# Patient Record
Sex: FEMALE | Race: WHITE | NOT HISPANIC OR LATINO
[De-identification: names, ages, dates, MRNs, and addresses within clinical notes are randomized per-mention and may not be internally consistent; named-entity substitution may affect disease eponyms.]

---

## 2018-11-13 ENCOUNTER — TRANSCRIPTION ENCOUNTER (OUTPATIENT)
Age: 53
End: 2018-11-13

## 2020-12-03 ENCOUNTER — TRANSCRIPTION ENCOUNTER (OUTPATIENT)
Age: 55
End: 2020-12-03

## 2021-11-05 ENCOUNTER — TRANSCRIPTION ENCOUNTER (OUTPATIENT)
Age: 56
End: 2021-11-05

## 2021-11-19 ENCOUNTER — EMERGENCY (EMERGENCY)
Facility: HOSPITAL | Age: 56
LOS: 1 days | Discharge: HOME | End: 2021-11-19
Attending: EMERGENCY MEDICINE
Payer: COMMERCIAL

## 2021-11-19 VITALS
OXYGEN SATURATION: 94 % | DIASTOLIC BLOOD PRESSURE: 75 MMHG | HEART RATE: 98 BPM | TEMPERATURE: 98 F | WEIGHT: 199.96 LBS | HEIGHT: 61 IN | RESPIRATION RATE: 18 BRPM | SYSTOLIC BLOOD PRESSURE: 112 MMHG

## 2021-11-19 LAB
ALBUMIN SERPL ELPH-MCNC: 3.7 G/DL — SIGNIFICANT CHANGE UP (ref 3.3–5.2)
ALP SERPL-CCNC: 61 U/L — SIGNIFICANT CHANGE UP (ref 40–120)
ALT FLD-CCNC: 13 U/L — SIGNIFICANT CHANGE UP
ANION GAP SERPL CALC-SCNC: 12 MMOL/L — SIGNIFICANT CHANGE UP (ref 5–17)
AST SERPL-CCNC: 14 U/L — SIGNIFICANT CHANGE UP
BASOPHILS # BLD AUTO: 0.07 K/UL — SIGNIFICANT CHANGE UP (ref 0–0.2)
BASOPHILS NFR BLD AUTO: 0.9 % — SIGNIFICANT CHANGE UP (ref 0–2)
BILIRUB SERPL-MCNC: 0.6 MG/DL — SIGNIFICANT CHANGE UP (ref 0.4–2)
BLD GP AB SCN SERPL QL: SIGNIFICANT CHANGE UP
BUN SERPL-MCNC: 30.4 MG/DL — HIGH (ref 8–20)
CALCIUM SERPL-MCNC: 8.6 MG/DL — SIGNIFICANT CHANGE UP (ref 8.6–10.2)
CHLORIDE SERPL-SCNC: 106 MMOL/L — SIGNIFICANT CHANGE UP (ref 98–107)
CO2 SERPL-SCNC: 23 MMOL/L — SIGNIFICANT CHANGE UP (ref 22–29)
CREAT SERPL-MCNC: 0.97 MG/DL — SIGNIFICANT CHANGE UP (ref 0.5–1.3)
EOSINOPHIL # BLD AUTO: 0.25 K/UL — SIGNIFICANT CHANGE UP (ref 0–0.5)
EOSINOPHIL NFR BLD AUTO: 3.1 % — SIGNIFICANT CHANGE UP (ref 0–6)
GLUCOSE SERPL-MCNC: 81 MG/DL — SIGNIFICANT CHANGE UP (ref 70–99)
HCG SERPL-ACNC: <4 MIU/ML — SIGNIFICANT CHANGE UP
HCT VFR BLD CALC: 32 % — LOW (ref 34.5–45)
HGB BLD-MCNC: 10.2 G/DL — LOW (ref 11.5–15.5)
IMM GRANULOCYTES NFR BLD AUTO: 0.3 % — SIGNIFICANT CHANGE UP (ref 0–1.5)
LYMPHOCYTES # BLD AUTO: 1.44 K/UL — SIGNIFICANT CHANGE UP (ref 1–3.3)
LYMPHOCYTES # BLD AUTO: 18 % — SIGNIFICANT CHANGE UP (ref 13–44)
MCHC RBC-ENTMCNC: 30.4 PG — SIGNIFICANT CHANGE UP (ref 27–34)
MCHC RBC-ENTMCNC: 31.9 GM/DL — LOW (ref 32–36)
MCV RBC AUTO: 95.5 FL — SIGNIFICANT CHANGE UP (ref 80–100)
MONOCYTES # BLD AUTO: 0.69 K/UL — SIGNIFICANT CHANGE UP (ref 0–0.9)
MONOCYTES NFR BLD AUTO: 8.6 % — SIGNIFICANT CHANGE UP (ref 2–14)
NEUTROPHILS # BLD AUTO: 5.51 K/UL — SIGNIFICANT CHANGE UP (ref 1.8–7.4)
NEUTROPHILS NFR BLD AUTO: 69.1 % — SIGNIFICANT CHANGE UP (ref 43–77)
PLATELET # BLD AUTO: 387 K/UL — SIGNIFICANT CHANGE UP (ref 150–400)
POTASSIUM SERPL-MCNC: 4.7 MMOL/L — SIGNIFICANT CHANGE UP (ref 3.5–5.3)
POTASSIUM SERPL-SCNC: 4.7 MMOL/L — SIGNIFICANT CHANGE UP (ref 3.5–5.3)
PROT SERPL-MCNC: 6.5 G/DL — LOW (ref 6.6–8.7)
RBC # BLD: 3.35 M/UL — LOW (ref 3.8–5.2)
RBC # FLD: 14.5 % — SIGNIFICANT CHANGE UP (ref 10.3–14.5)
SODIUM SERPL-SCNC: 141 MMOL/L — SIGNIFICANT CHANGE UP (ref 135–145)
WBC # BLD: 7.98 K/UL — SIGNIFICANT CHANGE UP (ref 3.8–10.5)
WBC # FLD AUTO: 7.98 K/UL — SIGNIFICANT CHANGE UP (ref 3.8–10.5)

## 2021-11-19 PROCEDURE — 80053 COMPREHEN METABOLIC PANEL: CPT

## 2021-11-19 PROCEDURE — 74177 CT ABD & PELVIS W/CONTRAST: CPT | Mod: MA

## 2021-11-19 PROCEDURE — 96374 THER/PROPH/DIAG INJ IV PUSH: CPT | Mod: XU

## 2021-11-19 PROCEDURE — 86850 RBC ANTIBODY SCREEN: CPT

## 2021-11-19 PROCEDURE — 84702 CHORIONIC GONADOTROPIN TEST: CPT

## 2021-11-19 PROCEDURE — 85025 COMPLETE CBC W/AUTO DIFF WBC: CPT

## 2021-11-19 PROCEDURE — 99285 EMERGENCY DEPT VISIT HI MDM: CPT

## 2021-11-19 PROCEDURE — 86901 BLOOD TYPING SEROLOGIC RH(D): CPT

## 2021-11-19 PROCEDURE — 36415 COLL VENOUS BLD VENIPUNCTURE: CPT

## 2021-11-19 PROCEDURE — 96375 TX/PRO/DX INJ NEW DRUG ADDON: CPT

## 2021-11-19 PROCEDURE — 96361 HYDRATE IV INFUSION ADD-ON: CPT

## 2021-11-19 PROCEDURE — 99284 EMERGENCY DEPT VISIT MOD MDM: CPT | Mod: 25

## 2021-11-19 PROCEDURE — 74177 CT ABD & PELVIS W/CONTRAST: CPT | Mod: 26,MA

## 2021-11-19 PROCEDURE — 86900 BLOOD TYPING SEROLOGIC ABO: CPT

## 2021-11-19 RX ORDER — IOHEXOL 300 MG/ML
30 INJECTION, SOLUTION INTRAVENOUS ONCE
Refills: 0 | Status: COMPLETED | OUTPATIENT
Start: 2021-11-19 | End: 2021-11-19

## 2021-11-19 RX ORDER — SODIUM CHLORIDE 9 MG/ML
1000 INJECTION INTRAMUSCULAR; INTRAVENOUS; SUBCUTANEOUS ONCE
Refills: 0 | Status: COMPLETED | OUTPATIENT
Start: 2021-11-19 | End: 2021-11-19

## 2021-11-19 RX ORDER — ONDANSETRON 8 MG/1
4 TABLET, FILM COATED ORAL ONCE
Refills: 0 | Status: COMPLETED | OUTPATIENT
Start: 2021-11-19 | End: 2021-11-19

## 2021-11-19 RX ORDER — KETOROLAC TROMETHAMINE 30 MG/ML
15 SYRINGE (ML) INJECTION ONCE
Refills: 0 | Status: DISCONTINUED | OUTPATIENT
Start: 2021-11-19 | End: 2021-11-19

## 2021-11-19 RX ORDER — FAMOTIDINE 10 MG/ML
20 INJECTION INTRAVENOUS ONCE
Refills: 0 | Status: COMPLETED | OUTPATIENT
Start: 2021-11-19 | End: 2021-11-19

## 2021-11-19 RX ADMIN — Medication 15 MILLIGRAM(S): at 17:08

## 2021-11-19 RX ADMIN — SODIUM CHLORIDE 1000 MILLILITER(S): 9 INJECTION INTRAMUSCULAR; INTRAVENOUS; SUBCUTANEOUS at 17:08

## 2021-11-19 RX ADMIN — SODIUM CHLORIDE 1000 MILLILITER(S): 9 INJECTION INTRAMUSCULAR; INTRAVENOUS; SUBCUTANEOUS at 19:01

## 2021-11-19 RX ADMIN — FAMOTIDINE 20 MILLIGRAM(S): 10 INJECTION INTRAVENOUS at 19:01

## 2021-11-19 RX ADMIN — ONDANSETRON 4 MILLIGRAM(S): 8 TABLET, FILM COATED ORAL at 17:08

## 2021-11-19 RX ADMIN — IOHEXOL 30 MILLILITER(S): 300 INJECTION, SOLUTION INTRAVENOUS at 18:47

## 2021-11-19 RX ADMIN — Medication 15 MILLIGRAM(S): at 19:01

## 2021-11-19 NOTE — ED STATDOCS - PATIENT PORTAL LINK FT
You can access the FollowMyHealth Patient Portal offered by [facility] by registering at the following website: [email or web address]. By joining [facility]’s FollowMyHealth portal, you will also be able to view your health information using other applications (apps) compatible with our system.

## 2021-11-19 NOTE — ED ADULT NURSE NOTE - OBJECTIVE STATEMENT
Patient A&O x 3, presenting to the ED with complaints of bloody stools x 3 days and left flank pain x 1 day. Patient stated she also has nausea. Denied SOB, cough, or fever. Patient reported a HX of hemorrhoids. No distress observed on exam, gait steady.

## 2021-11-19 NOTE — ED STATDOCS - CARE PROVIDER_API CALL
Levon Byers)  Gastroenterology; Internal Medicine  [address], [address]  [place] [address]  Phone: [phone]  Fax: [phone]  Follow Up Time:

## 2021-11-19 NOTE — ED ADULT NURSE NOTE - HAVE YOU HAD A FIRST COVID-19 BOOSTER?
Stable for RMF on 8 wollman. Given patient's thrombocytopenia and hx of bleeds on this admission, patient will undergo urgent CT head non-contrast to rule out bleed. No

## 2021-11-19 NOTE — ED STATDOCS - OBJECTIVE STATEMENT
55 y/o F with PMH diverticulitis, colitis, HTN, DM, RA presents complaining of bloody stool and LLQ pain with fogginess and HA's for 3 days. The blood started 3 days ago that is cherry colored, stool and blood together, loose stools, no increased frequency of stool. The LLQ pain is constant, but is exacerbated by eating. She denies associated nausea or vomiting, denies fevers. She was started by her PMD with Augmentin, took 2 doses so far. She has a history of colitis in the past which required hospitalization and this is similar, no blood transfusions. She is a former smoker, drinks socially, no illicit drugs.

## 2021-11-19 NOTE — ED STATDOCS - NSICDXPASTMEDICALHX_GEN_ALL_CORE_FT
PAST MEDICAL HISTORY:  Colitis     Diverticulitis     DM (diabetes mellitus)     HTN (hypertension)     Rheumatoid arthritis

## 2021-11-19 NOTE — ED STATDOCS - PROGRESS NOTE DETAILS
Varsha: I spoke with Dr. Lombardi who states her Hgb 2 days ago was 10.6 and that incidentally, her vitamin D level is 110 and she should stop taking vitamin D. ZENON SHERMAN: HPI/ ROS/ PEx as stated above. ZENON SHERMAN: CT with diverticulosis. Pt reporting improvement in pain, tolerating PO. Hemoglobin 10.2 compared to 10.6 when taken in the office 2 days ago. Will advise pt to continue taking Augmentin prescribed by PMD. Return precautions discussed. VSS.

## 2021-11-19 NOTE — ED STATDOCS - CLINICAL SUMMARY MEDICAL DECISION MAKING FREE TEXT BOX
57 y/o F with PMH diverticulitis and colitis presents with 3 days of LLQ pain, bloody stool. Vitals WNL, appears well, LLQ TTP. Will check labs, CT A/P, pain control, IV hydration and reassess. Patient already on Augmentin (2 doses) from PMD - may continue if uncomplicated diverticulitis.

## 2021-11-19 NOTE — ED ADULT NURSE REASSESSMENT NOTE - NS ED NURSE REASSESS COMMENT FT1
assumed care of pt at 19:30. report received from day shift rn. charting as noted. rr even and unlabored. able to move extremities well. anox4. call bell with in reach to maintain safety. no apparent distress noted. denies chest pain or sob. iv intact. vss. pt educated on plan of care, pt able to successfully teach back plan of care to RN, RN will continue to reeducate pt during hospital stay. awaiting ct. Statement Selected

## 2021-11-19 NOTE — ED STATDOCS - ATTENDING CONTRIBUTION TO CARE
I, Dianna Maddox, performed the initial face to face bedside interview with this patient regarding history of present illness, review of symptoms and relevant past medical, social and family history.  I completed an independent physical examination.  I was the initial provider who evaluated this patient. I have signed out the follow up of any pending tests (i.e. labs, radiological studies) to the ACP.  I have communicated the patient’s plan of care and disposition with the ACP.
No

## 2021-11-19 NOTE — ED STATDOCS - NSFOLLOWUPINSTRUCTIONS_ED_ALL_ED_FT
Follow up with primary medical doctor in 2-3 days   Follow up with GI doctor within 1-2 weeks   Copy of results printed and provided to you   Continue taking Augmentin as prescribed   Return to the ED for worsening pain, vomiting, fevers, chills, dizziness, shortness of breath, lightheadedness or any new or concerning symptoms        Diverticulosis    WHAT YOU NEED TO KNOW:    What is diverticulosis? Diverticulosis is a condition that causes small pockets called diverticula to form in your intestine. These pockets make it difficult for bowel movements to pass through your digestive system.    Diverticula         What causes diverticulosis? Diverticula form when muscles have to work hard to move bowel movements through the intestine. The force causes bulges to form at weak areas in the intestine. This may happen if you eat foods that are low in fiber. Fiber helps give your bowel movements more bulk so they are larger and easier to move through your colon. The following may increase your risk of diverticulosis:  •A history of constipation      •Age 40 or older      •Obesity      •Lack of exercise      What are the signs and symptoms of diverticulosis? Diverticulosis usually does not cause any signs or symptoms. It may cause any of the following in some people:  •Pain or discomfort in your lower abdomen      •Abdominal bloating      •Constipation or diarrhea      How is diverticulosis diagnosed? Your healthcare provider will examine you and ask about your bowel movements, diet, and symptoms. He or she will also ask about any medical conditions you have or medicines you take. You may need any of the following:  •Blood tests may be done to check for signs of inflammation.      •A barium enema is an x-ray of your colon that may show diverticula. A tube is put into your anus, and a liquid called barium is put through the tube. Barium is used so that healthcare providers can see your colon more clearly.      •Flexible sigmoidoscopy is a test to look for any changes in your lower intestines and rectum. It may also show the cause of any bleeding or pain. A soft, bendable tube with a light on the end will be put into your anus. It will then be moved forward into your intestine.      •A colonoscopy is used to look at your whole colon. A scope (long bendable tube with a light on the end) is used to take pictures. This test may show diverticula.      •A CT scan, or CAT scan, may show diverticula. You may be given contrast liquid before the scan. Tell the healthcare provider if you have ever had an allergic reaction to contrast liquid.      How is diverticulosis managed? The goal of treatment is to manage any symptoms you have and prevent other problems such as diverticulitis. Diverticulitis is swelling or infection of the diverticula. Your healthcare provider may recommend any of the following:  •Eat a variety of high-fiber foods. High-fiber foods help you have regular bowel movements. High-fiber foods include cooked beans, fruits, vegetables, and some cereals. Most adults need 25 to 35 grams of fiber each day. Your healthcare provider may recommend that you have more. Ask your healthcare provider how much fiber you need. Increase fiber slowly. You may have abdominal discomfort, bloating, and gas if you add fiber to your diet too quickly. You may need to take a fiber supplement if you are not getting enough fiber from food.             •Medicines to soften your bowel movements may be given. You may also need medicines to treat symptoms such as bloating and pain.      •Drink liquids as directed. You may need to drink 2 to 3 liters (8 to 12 cups) of liquids every day. Ask your healthcare provider how much liquid to drink each day and which liquids are best for you.      •Apply heat on your abdomen for 20 to 30 minutes every 2 hours for as many days as directed. Heat helps decrease pain and muscle spasms.      How can I help prevent diverticulitis or other symptoms? The following may help decrease your risk for diverticulitis or symptoms, such as bleeding. Talk to your provider about these or other things you can do to prevent problems that may occur with diverticulosis.  •Exercise regularly. Ask your healthcare provider about the best exercise plan for you. Exercise can help you have regular bowel movements. Get 30 minutes of exercise on most days of the week.   FAMILY WALKING FOR EXERCISE           •Maintain a healthy weight. Ask your healthcare provider what a healthy weight is for you. Ask him or her to help you create a weight loss plan if you are overweight.      •Do not smoke. Nicotine and other chemicals in cigarettes increase your risk for diverticulitis. Ask your healthcare provider for information if you currently smoke and need help to quit. E-cigarettes or smokeless tobacco still contain nicotine. Talk to your healthcare provider before you use these products.      •Ask your healthcare provider if it is safe to take NSAIDs. NSAIDs may increase your risk of diverticulitis.      When should I seek immediate care?   •You have severe pain on the left side of your lower abdomen.      •Your bowel movements are bright or dark red.      When should I call my doctor?   •You have a fever and chills.      •You feel dizzy or lightheaded.      •You have nausea, or you are vomiting.      •You have a change in your bowel movements.      •You have questions or concerns about your condition or care.

## 2021-11-19 NOTE — ED STATDOCS - PHYSICAL EXAMINATION
Const: Awake, alert and oriented. In no acute distress. Well appearing.  HEENT: NC/AT. Moist mucous membranes.  Eyes: No scleral icterus. EOMI.  Neck:. Soft and supple. Full ROM without pain.  Cardiac: Regular rate and regular rhythm. +S1/S2. No murmurs. Peripheral pulses 2+ and symmetric. No LE edema.  Resp: Speaking in full sentences. No evidence of respiratory distress. No wheezes, rales or rhonchi.  Abd: Soft, + LLQ TTP, non-distended. Normal bowel sounds in all 4 quadrants. No guarding or rebound.  Back: Spine midline and non-tender. No CVAT.  Skin: No rashes, abrasions or lacerations.  Neuro: Awake, alert & oriented x 3. Moves all extremities symmetrically.

## 2021-11-19 NOTE — ED STATDOCS - NS ED ROS FT
Const: + fatigue. Denies fever, chills  HEENT: Denies blurry vision, sore throat  Neck: Denies neck pain/stiffness  Resp: Denies coughing, SOB  Cardiovascular: Denies CP, palpitations, LE edema  GI: + abdominal pain, + diarrhea, + blood in stool. Denies nausea, vomiting, constipation  : Denies urinary frequency/urgency/dysuria, hematuria  MSK: Denies back pain  Neuro: + HA. Denies dizziness, numbness, weakness  Skin: Denies rashes.

## 2023-11-20 PROBLEM — K57.92 DIVERTICULITIS OF INTESTINE, PART UNSPECIFIED, WITHOUT PERFORATION OR ABSCESS WITHOUT BLEEDING: Chronic | Status: ACTIVE | Noted: 2021-11-19

## 2023-11-20 PROBLEM — I10 ESSENTIAL (PRIMARY) HYPERTENSION: Chronic | Status: ACTIVE | Noted: 2021-11-19

## 2023-11-20 PROBLEM — E11.9 TYPE 2 DIABETES MELLITUS WITHOUT COMPLICATIONS: Chronic | Status: ACTIVE | Noted: 2021-11-19

## 2023-11-20 PROBLEM — M06.9 RHEUMATOID ARTHRITIS, UNSPECIFIED: Chronic | Status: ACTIVE | Noted: 2021-11-19

## 2023-11-20 PROBLEM — K52.9 NONINFECTIVE GASTROENTERITIS AND COLITIS, UNSPECIFIED: Chronic | Status: ACTIVE | Noted: 2021-11-19

## 2023-12-26 ENCOUNTER — NON-APPOINTMENT (OUTPATIENT)
Age: 58
End: 2023-12-26

## 2024-10-01 ENCOUNTER — APPOINTMENT (OUTPATIENT)
Dept: NEUROLOGY | Facility: CLINIC | Age: 59
End: 2024-10-01
Payer: MEDICARE

## 2024-10-01 VITALS
DIASTOLIC BLOOD PRESSURE: 91 MMHG | HEART RATE: 88 BPM | BODY MASS INDEX: 40.59 KG/M2 | HEIGHT: 61 IN | WEIGHT: 215 LBS | SYSTOLIC BLOOD PRESSURE: 139 MMHG

## 2024-10-01 DIAGNOSIS — R41.840 ATTENTION AND CONCENTRATION DEFICIT: ICD-10-CM

## 2024-10-01 PROCEDURE — 99204 OFFICE O/P NEW MOD 45 MIN: CPT

## 2024-10-01 RX ORDER — LEFLUNOMIDE 20 MG/1
TABLET, FILM COATED ORAL
Refills: 0 | Status: ACTIVE | COMMUNITY

## 2024-10-01 RX ORDER — PANTOPRAZOLE SODIUM 20 MG/1
TABLET, DELAYED RELEASE ORAL
Refills: 0 | Status: ACTIVE | COMMUNITY

## 2024-10-01 RX ORDER — CHROMIUM 200 MCG
TABLET ORAL
Refills: 0 | Status: ACTIVE | COMMUNITY

## 2024-10-01 RX ORDER — MORPHINE SULFATE 15 MG/1
15 TABLET ORAL
Refills: 0 | Status: ACTIVE | COMMUNITY

## 2024-10-01 RX ORDER — APIXABAN 2.5 MG/1
2.5 TABLET, FILM COATED ORAL
Refills: 0 | Status: ACTIVE | COMMUNITY

## 2024-10-01 RX ORDER — LOSARTAN POTASSIUM AND HYDROCHLOROTHIAZIDE 12.5; 5 MG/1; MG/1
50-12.5 TABLET ORAL
Refills: 0 | Status: ACTIVE | COMMUNITY

## 2024-10-01 RX ORDER — GABAPENTIN 300 MG/1
300 CAPSULE ORAL
Refills: 0 | Status: ACTIVE | COMMUNITY

## 2024-10-01 RX ORDER — METOPROLOL SUCCINATE 100 MG/1
100 TABLET, EXTENDED RELEASE ORAL
Refills: 0 | Status: ACTIVE | COMMUNITY

## 2024-10-01 RX ORDER — AMLODIPINE BESYLATE 5 MG/1
5 TABLET ORAL
Refills: 0 | Status: ACTIVE | COMMUNITY

## 2024-10-01 RX ORDER — ROSUVASTATIN CALCIUM 5 MG/1
5 TABLET, FILM COATED ORAL
Refills: 0 | Status: ACTIVE | COMMUNITY

## 2024-10-01 RX ORDER — CYCLOBENZAPRINE HCL 5 MG
TABLET ORAL
Refills: 0 | Status: ACTIVE | COMMUNITY

## 2024-10-01 RX ORDER — OXYCODONE HYDROCHLORIDE AND ACETAMINOPHEN 10; 325 MG/1; MG/1
TABLET ORAL
Refills: 0 | Status: ACTIVE | COMMUNITY

## 2024-10-01 RX ORDER — FLUTICASONE FUROATE, UMECLIDINIUM BROMIDE AND VILANTEROL TRIFENATATE 200; 62.5; 25 UG/1; UG/1; UG/1
POWDER RESPIRATORY (INHALATION)
Refills: 0 | Status: ACTIVE | COMMUNITY

## 2024-10-01 NOTE — PHYSICAL EXAM
[Person] : oriented to person [Place] : oriented to place [Time] : oriented to time [Concentration Intact] : normal concentrating ability [Visual Intact] : visual attention was ~T not ~L decreased [Naming Objects] : no difficulty naming common objects [Repeating Phrases] : no difficulty repeating a phrase [Writing A Sentence] : no difficulty writing a sentence [Fluency] : fluency intact [Comprehension] : comprehension intact [Reading] : reading intact [Past History] : adequate knowledge of personal past history [Cranial Nerves Optic (II)] : visual acuity intact bilaterally,  visual fields full to confrontation, pupils equal round and reactive to light [Cranial Nerves Oculomotor (III)] : extraocular motion intact [Cranial Nerves Trigeminal (V)] : facial sensation intact symmetrically [Cranial Nerves Facial (VII)] : face symmetrical [Cranial Nerves Vestibulocochlear (VIII)] : hearing was intact bilaterally [Cranial Nerves Glossopharyngeal (IX)] : tongue and palate midline [Cranial Nerves Accessory (XI - Cranial And Spinal)] : head turning and shoulder shrug symmetric [Cranial Nerves Hypoglossal (XII)] : there was no tongue deviation with protrusion [Motor Tone] : muscle tone was normal in all four extremities [Motor Strength] : muscle strength was normal in all four extremities [Involuntary Movements] : no involuntary movements were seen [No Muscle Atrophy] : normal bulk in all four extremities [Paresis Pronator Drift Right-Sided] : no pronator drift on the right [Paresis Pronator Drift Left-Sided] : no pronator drift on the left [Motor Strength Upper Extremities Bilaterally] : strength was normal in both upper extremities [Motor Strength Lower Extremities Bilaterally] : strength was normal in both lower extremities [Sensation Tactile Decrease] : light touch was intact [Sensation Pain / Temperature Decrease] : pain and temperature was intact [Sensation Vibration Decrease] : vibration was intact [Proprioception] : proprioception was intact [Abnormal Walk] : normal gait [Balance] : balance was intact [Tremor] : no tremor present [Coordination - Dysmetria Impaired Finger-to-Nose Bilateral] : not present [2+] : Patella left 2+ [Sclera] : the sclera and conjunctiva were normal [PERRL With Normal Accommodation] : pupils were equal in size, round, reactive to light, with normal accommodation [Extraocular Movements] : extraocular movements were intact [No APD] : no afferent pupillary defect [No CARLOS ENRIQUE] : no internuclear ophthalmoplegia [Full Visual Field] : full visual field

## 2024-10-01 NOTE — REVIEW OF SYSTEMS
[As Noted in HPI] : as noted in HPI [Decr. Concentrating Ability] : decreased concentrating ability [Repeating Questions] : repeated questioning about recent events [Negative] : Heme/Lymph

## 2024-10-01 NOTE — ASSESSMENT
[FreeTextEntry1] : This is a 58-year-old woman with mild memory issues likely due to distractibility.  She has pain, takes pain medicines and has been under anesthesia multiple times.  Her exam is not concerning today for neurodegenerative disease.  I reassured her of this.  I will be happy to see her back in the future should the need arise

## 2024-10-01 NOTE — CONSULT LETTER
[Dear  ___] : Dear  [unfilled], [Courtesy Letter:] : I had the pleasure of seeing your patient, [unfilled], in my office today. [Please see my note below.] : Please see my note below. [Consult Closing:] : Thank you very much for allowing me to participate in the care of this patient.  If you have any questions, please do not hesitate to contact me. [Sincerely,] : Sincerely, [FreeTextEntry3] : Woodrow oBateng M.D., Ph.D. DPN-N [facility] [place] Director, Division of Neurology Director, [facility]

## 2024-10-01 NOTE — HISTORY OF PRESENT ILLNESS
[FreeTextEntry1] : Initial office visit October 1, 2024: This is a 58-year-old woman who presents today with complaint of memory problems.  She states her daughter feels that she is having difficulty with memory and insisted that she see a neurologist.  She may repeat stories to her daughter forget things that were said but that they will eventually come back to her.  She does not work as she is on disability but when she was working, she did not have any problems cognitively at work.  She does take medications for her back such as morphine sulfate and other pain medications which likely is causing some impediments to the mental status.  She does not leave items on the stove top to burn nor does she get lost driving.  She is here today for neurologic evaluation.